# Patient Record
Sex: FEMALE | Race: WHITE
[De-identification: names, ages, dates, MRNs, and addresses within clinical notes are randomized per-mention and may not be internally consistent; named-entity substitution may affect disease eponyms.]

---

## 2020-08-17 ENCOUNTER — HOSPITAL ENCOUNTER (INPATIENT)
Dept: HOSPITAL 97 - 2ND | Age: 85
LOS: 2 days | Discharge: HOME | DRG: 392 | End: 2020-08-19
Attending: INTERNAL MEDICINE | Admitting: INTERNAL MEDICINE
Payer: COMMERCIAL

## 2020-08-17 VITALS — BODY MASS INDEX: 23.2 KG/M2

## 2020-08-17 DIAGNOSIS — G89.29: ICD-10-CM

## 2020-08-17 DIAGNOSIS — E87.1: ICD-10-CM

## 2020-08-17 DIAGNOSIS — R73.01: ICD-10-CM

## 2020-08-17 DIAGNOSIS — J44.9: ICD-10-CM

## 2020-08-17 DIAGNOSIS — R53.1: ICD-10-CM

## 2020-08-17 DIAGNOSIS — K29.70: Primary | ICD-10-CM

## 2020-08-17 DIAGNOSIS — M54.9: ICD-10-CM

## 2020-08-17 DIAGNOSIS — M19.90: ICD-10-CM

## 2020-08-17 DIAGNOSIS — K21.9: ICD-10-CM

## 2020-08-17 DIAGNOSIS — Z11.59: ICD-10-CM

## 2020-08-17 DIAGNOSIS — K29.80: ICD-10-CM

## 2020-08-17 DIAGNOSIS — E03.9: ICD-10-CM

## 2020-08-17 DIAGNOSIS — K44.9: ICD-10-CM

## 2020-08-17 DIAGNOSIS — I10: ICD-10-CM

## 2020-08-17 DIAGNOSIS — Z90.710: ICD-10-CM

## 2020-08-17 DIAGNOSIS — C34.01: ICD-10-CM

## 2020-08-17 DIAGNOSIS — E86.9: ICD-10-CM

## 2020-08-17 DIAGNOSIS — E78.5: ICD-10-CM

## 2020-08-17 PROCEDURE — 88313 SPECIAL STAINS GROUP 2: CPT

## 2020-08-17 PROCEDURE — 85025 COMPLETE CBC W/AUTO DIFF WBC: CPT

## 2020-08-17 PROCEDURE — 36415 COLL VENOUS BLD VENIPUNCTURE: CPT

## 2020-08-17 PROCEDURE — 74018 RADEX ABDOMEN 1 VIEW: CPT

## 2020-08-17 PROCEDURE — 88312 SPECIAL STAINS GROUP 1: CPT

## 2020-08-17 PROCEDURE — 82150 ASSAY OF AMYLASE: CPT

## 2020-08-17 PROCEDURE — 80048 BASIC METABOLIC PNL TOTAL CA: CPT

## 2020-08-17 PROCEDURE — 85652 RBC SED RATE AUTOMATED: CPT

## 2020-08-17 PROCEDURE — 83690 ASSAY OF LIPASE: CPT

## 2020-08-17 PROCEDURE — 88305 TISSUE EXAM BY PATHOLOGIST: CPT

## 2020-08-17 PROCEDURE — 80053 COMPREHEN METABOLIC PANEL: CPT

## 2020-08-17 PROCEDURE — 71250 CT THORAX DX C-: CPT

## 2020-08-17 RX ADMIN — SODIUM CHLORIDE SCH MLS: 0.9 INJECTION, SOLUTION INTRAVENOUS at 22:29

## 2020-08-18 LAB
BUN BLD-MCNC: 15 MG/DL (ref 7–18)
BUN BLD-MCNC: 15 MG/DL (ref 7–18)
GLUCOSE SERPLBLD-MCNC: 84 MG/DL (ref 74–106)
GLUCOSE SERPLBLD-MCNC: 86 MG/DL (ref 74–106)
POTASSIUM SERPL-SCNC: 4.1 MMOL/L (ref 3.5–5.1)
POTASSIUM SERPL-SCNC: 4.2 MMOL/L (ref 3.5–5.1)

## 2020-08-18 RX ADMIN — Medication SCH CAP: at 12:57

## 2020-08-18 RX ADMIN — SODIUM CHLORIDE SCH MLS: 0.9 INJECTION, SOLUTION INTRAVENOUS at 18:44

## 2020-08-18 RX ADMIN — Medication SCH TAB: at 12:57

## 2020-08-18 RX ADMIN — Medication SCH: at 09:00

## 2020-08-18 RX ADMIN — Medication SCH DROP: at 21:17

## 2020-08-18 RX ADMIN — Medication SCH TAB: at 21:16

## 2020-08-18 RX ADMIN — SODIUM CHLORIDE SCH MLS: 0.9 INJECTION, SOLUTION INTRAVENOUS at 08:56

## 2020-08-18 RX ADMIN — Medication SCH MG: at 12:57

## 2020-08-18 NOTE — HP
Date of Admission:  2020



Chief Complaint:  Nausea, vomiting.



History Of Present Illness:  This is an 85-year-old female patient who started to have nausea, vomiti
ng over the weekend for the last 2 days and she is not able to keep any food or liquids down in her s
tomach, feeling very weak with this complaints and also having some headache.  No fall, no injury.  S
he actually was scheduled to have outpatient EGD with Dr. Wade which was scheduled for tomorrow, bu
t today with this complaint, she talked to Dr. Wade who ordered some outpatient blood work and abdo
dominic x-ray and her abdominal x-ray came back unremarkable.  Outpatient blood work that she had came 
back with a low sodium of 124.  The patient's family had contacted me this afternoon with her nausea,
 vomiting, and not able to keep any food or liquids down in her stomach, and she was evaluated via [facility] initially, and then once I reviewed all the test results while I was evaluating her, it was r
ecommended for her to be admitted to the hospital, and she was brought to the office, evaluated, and 
she was admitted to the hospital.  She denies any blood in stool, no fever, no chills.  No abdominal 
pain.



Allergies:  NO KNOWN ALLERGIES.



Medications:  List reviewed.



Review of Systems:

GI:  As mentioned above.  

Constitutional:  Weight loss, reduced appetite, generalized weakness. 

Musculoskeletal:  Chronic back pain, unchanged. 

All other systems reviewed and negative.



Past Medical History:  Hypothyroidism, impaired fasting glucose, COPD, hypertension, hyperlipidemia, 
hiatal hernia, gastroesophageal reflux disease, osteoarthritis at multiple sites, anemia, osteopenia.




Past Surgical History:  Hysterectomy.



Family History:  Father  due to esophageal cancer.  Mother had stroke and she  due to that.  
Brother; heart disease, hypertension.



Social History:  Negative for smoking and alcohol use.



Physical Examination:

Vital Signs:  Upon admission, temperature 97.2, pulse 82, respiratory rate 18, blood pressure 100/53.
  Height 5 feet, weight 119 pounds. 

General:  The patient appears weaker than normal, awake, alert, not in any distress. 

HEENT:  Head atraumatic, normocephalic.  Conjunctivae nonerythematous.  Sclerae white.  Mouth, no thr
ush or edema noted.  Ears/Nose, no mass, lesion, discharge noted. 

Neck:  Supple. No JVD, lymph nodes, bruit, thyromegaly noted. 

Lungs:  Bilateral good equal air entry. Clear to auscultation. No rhonchi.  No rales. 

Heart:  Normal heart sounds, no murmur or gallop. 

Abdomen:  Soft, bowel sounds normal. No guarding, rigidity, tenderness, mass, hepatosplenomegaly, dis
tention, or bruit noted. 

Extremities:  No leg edema.  No calf tenderness. 

Skin:  No rash, ulcer, cellulitis. 

Lymphatics:  No lymph node enlargement in neck, supraclavicular, infraclavicular region. 

Neuro:  No focal neurological deficit. 

Chest:  Unremarkable. 

External Genitalia:  Deferred. 

Rectal:  Deferred.



Laboratory Data:  White count 10, hemoglobin 13.9, platelets 323.  Sodium 124, potassium 4.4, chlorid
e 88, bicarb 27, BUN 16, creatinine 0.95, glucose 109.  Liver function tests unremarkable.  Amylase 6
4, lipase 144.  Her x-ray KUB done today shows no acute changes, no bowel obstruction, small bilatera
l pleural effusion present.



Impression:  

1.Volume depletion.

2.Hyponatremia.

3.Headache.

4.Hiatal hernia.

5.Hypertension.

6.Hyperlipidemia.

7.Impaired fasting glucose.

8.Hypothyroidism.

9.Chronic obstructive pulmonary disease.

10.Gastroesophageal reflux disease.

11.Osteoarthritis, multiple sites.



Plan:  Admit the patient to hospital for further evaluation and management of this problem.  The [facility]
ent is appropriate for inpatient and is expected to spend 2 midnights in hospital.  We will go ahead 
and start the patient on IV fluid, which is normal saline at 100 cc/hour.  COVID-19 test will be done
 as a part of routine tests for this hospital admission.  I did call and discuss details with Dr. Tamra viveros and he was made aware of the patient will be admitted to the hospital and I will repeat blood wor
k tomorrow morning depending on her tomorrow sodium level.  If sodium level gets to 130 or higher mario
n 130 then it will be safe for her to undergo EGD procedure that Dr. Wade is planning to do which c
an be done tomorrow depending on the blood work results.  All these details were discussed with the p
atient, the patient's daughter, as well as Dr. Wade.  If sodium level drops down any further, we wi
ll have to consider moving her to ICU and order 3% saline solution.  Home medications will be given a
s per order.  Details of plan and treatment were discussed with the patient and family.





JONELLE

DD:  2020 22:54:33Voice ID:  [id number]

## 2020-08-18 NOTE — PN
Date of Progress Note:  08/18/2020



Subjective:  The patient was seen this morning for followup.  No new complaints or problems reported 
by her.  Lying in bed.  Not in distress.  Her daughter was with her at bedside.



Objective:  HEENT:  Unremarkable. 

Lungs:  Clear to auscultation. 

Heart:  Sounds normal. 

Abdomen:  Soft.  Bowel sounds normal.  No guarding, rigidity, tenderness, or distention. 

Extremities:  No leg edema.



Laboratory Data:  Sodium 128, potassium 4.2, chloride 93, bicarb 26, BUN 15, creatinine 0.84, glucose
 86.  CAT scan of the chest done today after I saw her shows right lung and hilar mass, concerned abo
ut lung cancer.



Impression:  

1.Hyponatremia.

2.Hypertension.

3.Generalized weakness.

4.Rule out lung cancer.



Plan:  We will go ahead and continue current IV fluid.  Sodium level is improving.  I did talk to Dr. Wade and he is planning to do EGD tomorrow.  Dr. Wade was made aware of CAT scan finding.  I fidencio
l call the patient's daughter tonight and discuss details, and I will discuss with the patient also t
omorrow morning about the result.  Possible discharge to go home tomorrow depending on her condition.






RASHI/MODL

DD:  08/18/2020 19:20:16Voice ID:  [id number]

DT:  08/18/2020 22:11:23Report ID:  [id number]

## 2020-08-18 NOTE — RAD REPORT
EXAM DESCRIPTION:  CT - Thorax Wo Con

 

CLINICAL HISTORY:  Chest pain

abnormal CXR

 

COMPARISON:  Chest Abdomen W Con dated 2/21/2017; Chest Pa And Lat (2 Views) dated 8/5/2020; Abdomen 
1 View (KUB) dated 8/17/2020

 

FINDINGS:  A large mass is present in the mediastinum measuring 6.4 x 3.9 cm in the pretracheal regio
n as well as the right hilar region. Right hilar mass component measures 4.8 x 3.0 cm. Abnormal soft 
tissue extends posteriorly from the right hilum to abut the pleura measuring 4.8 x 3.2 cm. Enlarged l
ymph nodes in the right paratracheal location is present the largest measuring 18 mm. Irregular locul
ated right pleural effusion is present. The lungs are diffusely emphysematous. Trace left pleural eff
usion. Small amount of pericardial fluid present. No pneumothorax.

 

A large diaphragmatic hernia is present.

 

No lytic or blastic bone lesion. No gross upper abdominal finding.

 

All CT scans are performed using dose optimization technique as appropriate and may include automated
 exposure control or mA/KV adjustment according to patient size.

 

IMPRESSION:  Large, irregular neoplastic mass is seen in the right hilum and right mediastinal locati
on. Irregular soft tissue extends posteriorly from the right hilum to the pleural surface of the righ
t upper lobe posteriorly. The findings are most compatible with lung malignancy. Loculated moderate s
ize right pleural effusion.

## 2020-08-19 VITALS — OXYGEN SATURATION: 88 %

## 2020-08-19 VITALS — TEMPERATURE: 98.2 F | DIASTOLIC BLOOD PRESSURE: 70 MMHG | SYSTOLIC BLOOD PRESSURE: 150 MMHG

## 2020-08-19 PROCEDURE — 0DB68ZX EXCISION OF STOMACH, VIA NATURAL OR ARTIFICIAL OPENING ENDOSCOPIC, DIAGNOSTIC: ICD-10-PCS

## 2020-08-19 RX ADMIN — SODIUM CHLORIDE SCH: 0.9 INJECTION, SOLUTION INTRAVENOUS at 14:00

## 2020-08-19 RX ADMIN — Medication SCH MG: at 10:59

## 2020-08-19 RX ADMIN — Medication SCH TAB: at 10:59

## 2020-08-19 RX ADMIN — Medication SCH DROP: at 11:00

## 2020-08-19 RX ADMIN — SODIUM CHLORIDE SCH MLS: 0.9 INJECTION, SOLUTION INTRAVENOUS at 05:08

## 2020-08-19 RX ADMIN — Medication SCH CAP: at 10:59

## 2020-08-19 NOTE — ENDO RPT
[address], [address]





EGD PROCEDURE REPORT     EXAM DATE: 08/19/2020



PATIENT NAME:          Estelle Barcenas          MR#:       [id number]

YOB: 1935     VISIT #:     [id number]

ATTENDING:     Solomon Wade Dr     STATUS:     inpatient

ASSISTANT:      Charly Palacios CST and Mena Stone RN



INDICATIONS:  The patient is a 85 yr old Female here for an EGD due to nausea

and vomiting and GERD

PROCEDURE PERFORMED:     EGD with biopsy

MEDICATIONS:     Per Anesthesia.

TOPICAL ANESTHETIC:     none



CONSENT:  The patient understands the risks and benefits of the procedure and

understands that these risks include, but are not limited to: sedation,

allergic reaction, infection, perforation and/or bleeding. Alternative means of

evaluation and treatment include, among others: physical exam, x-rays, and/or

surgical intervention. The patient elects to proceed with this endoscopic

procedure.



DESCRIPTION OF PROCEDURE:  During intra-op preparation period all mechanical 

medical equipment was checked for proper function. Hand hygiene and appropriate

measures for infection prevention was taken.  Procedure, possible

complications, and alternatives including but not limited to the possibility of

bleeding, perforation, tear, infection, sepsis, need for surgery, need for

blood transfusion, and anesthesia related complications were explained to the

patient.  After the risks, benefits and alternatives of the procedure were

thoroughly explained, Informed consent was verified, confirmed and timeout was

successfully executed by the treatment team. The patient was  placed in the

left lateral position.  The patient was anesthetized with topical anesthesia.

Through the anesthetized oropharyngeal area, the scope was passed without any

difficulty.  The EG-[id number] ([id number]) endoscope was introduced through the mouth

and advanced to the third portion of the duodenum.  Retroflexed views revealed

a large hiatal hernia.  The gastroscope was then slowly withdrawn and removed.



A large hiatal hernia was found (7 cm, GEJ at 28 cm, DI at 35 cm) with DI

located at the proximal antrum (3/4 of stomach in chest).  Moderate gastritis

with specks of dark heme was found in the total stomach.  Multiple biopsies

were obtained and sent to pathology.   Duodenitis with black petechiae in white

background mucosa was found in the bulb / descending duodenum to 3rd portion of

duodenum.  Multiple biopsies were obtained and sent to pathology.







ADVERSE EVENTS:     There were no complications.

IMPRESSIONS:     1.  Large hiatal hernia (7 cm, GEJ at 28 cm, DI at 35 cm) with

DI located at the proximal antrum (3/4 of stomach in chest)

2.  Moderate gastritis with specks of dark heme in the total stomach, s/p

biopsies

3.  Duodenitis with black petechiae in white background mucosa the bulb /

descending to 3rd portion of duodenum and beyond



RECOMMENDATIONS:     1.  await biopsy results

2.  acid suppression therapy

REPEAT EXAM:





___________________________________

Solomon Wade Dr

eSigned:  Solomon Wade Dr 08/19/2020 8:08 AM





cc: Ariel Schmitz



CPT CODES:

ICD9 CODES:





PATIENT NAME:  Estelle Barcenas

MR#: [id number]

## 2020-08-20 NOTE — DS
Date of Discharge:  08/19/2020



Disposition:  Discharged to go home.



Physical Examination:

HEENT:  Unremarkable. 

Lungs:  Clear to auscultation. 

Heart:  Sounds normal. 

Abdomen:  Soft.  Bowel sounds normal.  No guarding, rigidity, tenderness, or distention. 

Extremities:  No leg edema.



Laboratory Data:  Last sodium level yesterday was 130, potassium 4.1, chloride 93, bicarb 26, BUN 15,
 creatinine 0.84, glucose 84.  CAT scan of the chest shows mediastinal, right hilar, and right upper 
lobe mass, likely due to lung cancer.



Hospital Course:  This is an 85-year-old female patient, admitted to the hospital with nausea, vomiti
ng.  Please see dictated H and P for more information.  After the patient was evaluated in outpatient
, she was admitted directly to the hospital.  The patient was started on IV fluid and her hyponatremi
a, which was thought to be due to volume depletion has improved with IV fluid hydration.  Dr. Wade 
was consulted from GI Service, who did an EGD on her, found out that the patient has very large hiata
l hernia with some evidence of gastritis.  No other acute findings noted.  Her CAT scan of the chest 
was done yesterday, results reviewed with the patient's daughter and the patient today.  She is not a
 candidate for any kind of surgical intervention for this and chemotherapy and radiation therapy will
 be what probably she will need once we prove the diagnosis of lung cancer, which will require lung b
iopsy.  All these details were discussed with the patient and the patient's daughter today.  The ashok
ent's daughter told me that the patient might lean towards no biopsy, no treatment and the patient al
so informed me about the same thing, but at the same time, she is not sure about her decision at this
 point and I have advised her that after going home, she will need to discuss with her daughter and u
ltimately make a decision.  Whatever she decides, we will assist her with that decision.  If she deci
amy to pursue further workup and treatment, then we will assist her with that.  If she decides no fur
ther workup, no treatment, then at appropriate time in the future we will provide assistance with hos
pice care with diagnosis of probable lung cancer.  All these details were discussed with the patient 
and the patient's daughter today.



Final Diagnoses:  

1.Volume depletion.

2.Hyponatremia.

3.Probable lung cancer.

4.Hiatal hernia.

5.Hypertension.

6.Hyperlipidemia.

7.Impaired fasting glucose.

8.Hypothyroidism.

9.Chronic obstructive pulmonary disease.

10.Gastroesophageal reflux disease.

11.Osteoarthritis, multiple sites.





RASHI/MODL

DD:  08/19/2020 19:43:11Voice ID:  [id number]

DT:  08/20/2020 05:13:00Report ID:  [id number]

## 2020-08-21 NOTE — CON
Date of Consultation:  2020



Reason For Consultation:  Persistent nausea, vomiting, reflux disease.



History Of Present Illness:  The patient is an 85-year-old white female with history of COPD, hyperte
nsion, hyperlipidemia, gastric reflux disease, osteoarthritis, large hiatal hernia, hypothyroidism.  
The patient presented to hospital with nausea, vomiting, regurgitation, and hyponatremia with sodium 
of 124.  The patient had come to GI attention due to her nausea, vomiting, GERD, and some pain as an 
outpatient and does report dysphagia, which the patient denies.  KUB was ordered which was unremarkab
le.  However, her labs that were ordered reveal a low sodium of 124.  Decision was made by primary ca
re to put her into the hospital for further evaluation and care and resuscitation of her sodium level
.  In the process, the patient has been diagnosed with a new lung cancer with a 6.4 cm peritracheal m
ass on the right side and a right hilar 4.8 cm mass as well.  The patient quit tobacco approximately 
40 years ago.



Past Medical History:  Significant for hypothyroidism, COPD, impaired fasting glucose, hypertension, 
hyperlipidemia, large hiatal hernia, gastric reflux disease, osteoarthritis at multiple sites, anemia
, and osteopenia.



Past Surgical History:  Hysterectomy.



Family History:  Father  of esophageal cancer.  Mother  of stroke.  Brother, heart disease, h
ypertension.



Social History:  She is a .  One daughter.  She quit tobacco 40 years ago.  No alcohol.



Allergies:  NKDA.



Medications:  See her list.



Review of Systems:

The patient has nausea, vomiting, reflux disease.  She denies any dysphagia, abdominal pain, fevers, 
chills, night sweats, heat or cold intolerance, muscle aches, joint aches, backaches.  No hemoptysis,
 hematuria, dysuria, polydipsia, melena, hematochezia, depression, anxiety, muscle aches, joint aches
.  She does have some joint aches, backaches and arthritis.



Physical Examination:

Vital signs:  She is 5 feet, 119 pounds, BMI 23.2 kg/m2. 

General:  She is an elderly female, lying in bed, in no acute distress. 

HEENT:  Normocephalic, atraumatic.  Anicteric.  Pupils are equal, round, and reactive to light.  Extr
aocular movements intact.  Oropharynx is clear. 

Neck:  Supple.  No masses. 

Respirations:  Clear to auscultation bilaterally.  She had kyphosis of her back. 

Abdomen:  Positive bowel sounds.  Soft, nontender, nondistended.  No hepatosplenomegaly. 

Extremities:  No clubbing, cyanosis.  No edema.  2+ pulses. 

Neuro:  Alert and oriented X3.  Grossly nonfocal.  5/5 motor, sensation light touch.



Data:  The patient's sodium is 124 and up to 128 and again repeated at 130, potassium now 4.1, chlori
de 93, bicarb 26, BUN 15, creatinine 0.8, glucose 84. 



Chest x-ray previously has shown some abnormalities possibly in the right lung fields.  The CT showed
 a large 6.4 x 3.9 cm peritracheal right hilar mass and also 4.8 x 3.0 cm right hilar mass.



Impression:  

1.Nausea, vomiting.

2.Gastric reflux disease.

3.Hyponatremia.  Sodium 124-128, 130 with IV normal saline hydration.  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

4.New lung cancer diagnosis with 2 mass 6.4 cm peritracheal mass and 4.8 cm right hilar mass.

5.History of hypothyroidism, chronic obstructive pulmonary disease, hypertension, hyperlipidemia, la
rge hiatal hernia.  Gastric reflux disease, osteoarthritis, anemia, hysterectomy, congenital kidney o
nly on 1 side and other as per above.



Recommendation:  

1.Continue repletion of sodium with IV fluids.

2.EGD.

3.Lung cancer.  Continue workup and therapy.

4.Acid suppression prophylaxis in this patient with nausea, vomiting, reflux disease in this elderly
 patient in the hospital setting.

5.Acid suppression prophylaxis.





KB/JOHANNA

DD:  2020 17:16:10Voice ID:  [id number]

DT:  2020 21:07:02Report ID:  [id number]